# Patient Record
Sex: FEMALE | Race: OTHER | HISPANIC OR LATINO | URBAN - METROPOLITAN AREA
[De-identification: names, ages, dates, MRNs, and addresses within clinical notes are randomized per-mention and may not be internally consistent; named-entity substitution may affect disease eponyms.]

---

## 2018-11-12 ENCOUNTER — EMERGENCY (EMERGENCY)
Facility: HOSPITAL | Age: 53
LOS: 1 days | Discharge: HOME | End: 2018-11-12
Attending: STUDENT IN AN ORGANIZED HEALTH CARE EDUCATION/TRAINING PROGRAM
Payer: MEDICAID

## 2018-11-12 VITALS
TEMPERATURE: 98 F | RESPIRATION RATE: 16 BRPM | DIASTOLIC BLOOD PRESSURE: 100 MMHG | SYSTOLIC BLOOD PRESSURE: 153 MMHG | HEART RATE: 90 BPM | OXYGEN SATURATION: 96 %

## 2018-11-12 LAB — HCG UR QL: NEGATIVE — SIGNIFICANT CHANGE UP

## 2018-11-12 PROCEDURE — 94640 AIRWAY INHALATION TREATMENT: CPT

## 2018-11-12 PROCEDURE — 99284 EMERGENCY DEPT VISIT MOD MDM: CPT

## 2018-11-12 PROCEDURE — 81025 URINE PREGNANCY TEST: CPT

## 2018-11-12 PROCEDURE — 71046 X-RAY EXAM CHEST 2 VIEWS: CPT

## 2018-11-12 PROCEDURE — 71250 CT THORAX DX C-: CPT | Mod: 26

## 2018-11-12 PROCEDURE — 71046 X-RAY EXAM CHEST 2 VIEWS: CPT | Mod: 26

## 2018-11-12 PROCEDURE — 99285 EMERGENCY DEPT VISIT HI MDM: CPT | Mod: 25

## 2018-11-12 PROCEDURE — 71250 CT THORAX DX C-: CPT

## 2018-11-12 RX ORDER — IPRATROPIUM/ALBUTEROL SULFATE 18-103MCG
3 AEROSOL WITH ADAPTER (GRAM) INHALATION ONCE
Qty: 0 | Refills: 0 | Status: COMPLETED | OUTPATIENT
Start: 2018-11-12 | End: 2018-11-12

## 2018-11-12 RX ORDER — ALBUTEROL 90 UG/1
2 AEROSOL, METERED ORAL
Qty: 1 | Refills: 0 | OUTPATIENT
Start: 2018-11-12

## 2018-11-12 RX ADMIN — Medication 3 MILLILITER(S): at 17:38

## 2018-11-12 RX ADMIN — Medication 60 MILLIGRAM(S): at 17:38

## 2018-11-12 RX ADMIN — Medication 3 MILLILITER(S): at 17:49

## 2018-11-12 RX ADMIN — Medication 3 MILLILITER(S): at 19:07

## 2018-11-12 NOTE — ED PROVIDER NOTE - OBJECTIVE STATEMENT
52 y/o F pt with a PMHx of Asthma and no significant PSHx presents to ED c/o cough and SOB x3 days. Pt notes associated CP only when coughing. Pt endorses that the sx's feel similar to her previous asthma exacerbations. Pt reports taking albuterol without any relief. Pt denies nausea, vomiting, fevers, chills, recent travel, leg pain, swelling, or any other complaints. NKDA.

## 2018-11-12 NOTE — ED ADULT NURSE NOTE - OBJECTIVE STATEMENT
53yr old c/o cough , asthma x 3 days, a&ox3,vital signs stable, independent, pt stated sob started 3days ago, pt shows no use of accessory muscles, wheezing on exertion present at this time, pt is resting comfortable in chair, pt safety will be maintained, will continue to monitor

## 2018-11-12 NOTE — ED PROVIDER NOTE - MEDICAL DECISION MAKING DETAILS
54 y/o F pt presents to ED c/o 3 days cough and SOB. Exam significant for wheezing likely secondary to asthma; given age, will obtain CXR and EKG to r/o ACS, PNA, PNX; treat asthma and reassess.

## 2020-07-29 NOTE — ED ADULT NURSE NOTE - NSIMPLEMENTINTERV_GEN_ALL_ED
No Implemented All Universal Safety Interventions:  [place] to call system. Call bell, personal items and telephone within reach. Instruct patient to call for assistance. Room bathroom lighting operational. Non-slip footwear when patient is off stretcher. Physically safe environment: no spills, clutter or unnecessary equipment. Stretcher in lowest position, wheels locked, appropriate side rails in place.